# Patient Record
Sex: MALE | Race: WHITE | NOT HISPANIC OR LATINO | Employment: STUDENT | URBAN - METROPOLITAN AREA
[De-identification: names, ages, dates, MRNs, and addresses within clinical notes are randomized per-mention and may not be internally consistent; named-entity substitution may affect disease eponyms.]

---
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
## 2023-01-09 DIAGNOSIS — F41.8 DEPRESSION WITH ANXIETY: ICD-10-CM

## 2023-01-09 RX ORDER — FLUOXETINE 20 MG/1
TABLET, FILM COATED ORAL
Qty: 30 TABLET | Refills: 1 | Status: SHIPPED | OUTPATIENT
Start: 2023-01-09 | End: 2023-02-08

## 2023-01-09 NOTE — TELEPHONE ENCOUNTER
"Request for medication refill:  Fluoxetine 20 mg tablets    Providers if patient needs an appointment and you are willing to give a one month supply please refill for one month and  send a letter/MyChart using \".SMILLIMITEDREFILL\" .smillimited and route chart to \"P [facility] \" (Giving one month refill in non controlled medications is strongly recommended before denial)    If refill has been denied, meaning absolutely no refills without visit, please complete the smart phrase \".smirxrefuse\" and route it to the \"P [facility] MED REFILLS\"  pool to inform the patient and the pharmacy.    Ruy Banegas MA        "

## 2023-02-08 ENCOUNTER — OFFICE VISIT (OUTPATIENT)
Dept: FAMILY MEDICINE | Facility: CLINIC | Age: 18
End: 2023-02-08
Payer: MEDICAID

## 2023-02-08 VITALS
OXYGEN SATURATION: 96 % | BODY MASS INDEX: 20.65 KG/M2 | DIASTOLIC BLOOD PRESSURE: 77 MMHG | RESPIRATION RATE: 16 BRPM | WEIGHT: 155.8 LBS | SYSTOLIC BLOOD PRESSURE: 120 MMHG | HEIGHT: 73 IN | TEMPERATURE: 98.1 F | HEART RATE: 63 BPM

## 2023-02-08 DIAGNOSIS — F11.20 OPIOID USE DISORDER, SEVERE, DEPENDENCE (H): ICD-10-CM

## 2023-02-08 DIAGNOSIS — Z13.9 SCREENING FOR CONDITION: Primary | ICD-10-CM

## 2023-02-08 DIAGNOSIS — F41.8 DEPRESSION WITH ANXIETY: ICD-10-CM

## 2023-02-08 PROCEDURE — 93000 ELECTROCARDIOGRAM COMPLETE: CPT | Performed by: FAMILY MEDICINE

## 2023-02-08 PROCEDURE — 99214 OFFICE O/P EST MOD 30 MIN: CPT | Performed by: FAMILY MEDICINE

## 2023-02-08 RX ORDER — FLUOXETINE 20 MG/1
20 TABLET, FILM COATED ORAL DAILY
Qty: 30 TABLET | Refills: 1 | Status: SHIPPED | OUTPATIENT
Start: 2023-02-08 | End: 2023-04-18

## 2023-02-08 ASSESSMENT — ANXIETY QUESTIONNAIRES
7. FEELING AFRAID AS IF SOMETHING AWFUL MIGHT HAPPEN: MORE THAN HALF THE DAYS
5. BEING SO RESTLESS THAT IT IS HARD TO SIT STILL: SEVERAL DAYS
GAD7 TOTAL SCORE: 13
GAD7 TOTAL SCORE: 13
IF YOU CHECKED OFF ANY PROBLEMS ON THIS QUESTIONNAIRE, HOW DIFFICULT HAVE THESE PROBLEMS MADE IT FOR YOU TO DO YOUR WORK, TAKE CARE OF THINGS AT HOME, OR GET ALONG WITH OTHER PEOPLE: EXTREMELY DIFFICULT
2. NOT BEING ABLE TO STOP OR CONTROL WORRYING: SEVERAL DAYS
1. FEELING NERVOUS, ANXIOUS, OR ON EDGE: MORE THAN HALF THE DAYS
3. WORRYING TOO MUCH ABOUT DIFFERENT THINGS: MORE THAN HALF THE DAYS
6. BECOMING EASILY ANNOYED OR IRRITABLE: NEARLY EVERY DAY

## 2023-02-08 ASSESSMENT — PATIENT HEALTH QUESTIONNAIRE - PHQ9
5. POOR APPETITE OR OVEREATING: MORE THAN HALF THE DAYS
SUM OF ALL RESPONSES TO PHQ QUESTIONS 1-9: 15

## 2023-02-08 NOTE — PROGRESS NOTES
"Carroll was seen today for ekg and refill request.    Diagnoses and all orders for this visit:    Screening for condition  -     EKG 12-lead complete w/read - Clinics    Depression with anxiety.  Major symptom is anxiety with occasional moments of panic-like symptoms.  He feels he is doing okay on fluoxetine and is asking for refill.  -     FLUoxetine 20 MG tablet; Take 1 tablet (20 mg) by mouth daily    Opioid use disorder, severe, dependence (H).  On methadone therapy and has been sober for 1 month.  I reinforced importance of adhering to this plan.  EKG shows QT interval of 395 ms and the EKG result was faxed to his methadone treatment center.              Subjective     EKG (Present for ekg visit ) and Refill Request (Fluoxetine)        HPI   He is here for management of opioid use disorder and depression with prominent anxiety features.  Over the past few months was converted from Suboxone therapy to methadone and is now getting his therapy through a methadone clinic.  The clinic is requesting an EKG from us to check for QT prolongation.  Patient states he has been sober for a month.  Still having significant anxiety symptoms and continues on fluoxetine.        Review of Systems         Objective    /77   Pulse 63   Temp 98.1  F (36.7  C) (Oral)   Resp 16   Ht 1.842 m (6' 0.5\")   Wt 70.7 kg (155 lb 12.8 oz)   SpO2 96%   BMI 20.84 kg/m    Body mass index is 20.84 kg/m .  Physical Exam  Constitutional:       General: He is not in acute distress.     Appearance: Normal appearance. He is normal weight.   Neurological:      Mental Status: He is alert.   Psychiatric:         Mood and Affect: Mood normal.         Behavior: Behavior normal.        EKG obtained today is normal with QT interval of 395 ms.    Office Visit on 12/06/2022   Component Date Value Ref Range Status     Cannabinoids (59-irj-3-carboxy-9-T* 12/06/2022 Detected (A)  Not Detected, Indeterminate Final    Cutoff for a positive cannabinoid " is greater than 50 ng/ml.  This is an unconfirmed screening result to be used for medical purposes only.      Phencyclidine 12/06/2022 Not Detected  Not Detected, Indeterminate Final    Cutoff for a negative PCP is 25 ng/mL or less.     Cocaine (Benzoylecgonine) 12/06/2022 Not Detected  Not Detected, Indeterminate Final    Cutoff for a negative cocaine is 150 ng/ml or less.     Methamphetamine (d-Methamphetamine) 12/06/2022 Not Detected  Not Detected, Indeterminate Final    Cutoff for a negative methamphetamine is 500 ng/ml or less.     Opiates (Morphine) 12/06/2022 Not Detected  Not Detected, Indeterminate Final    Cutoff for a negative opiate is 100 ng/ml or less.     Amphetamine (d-Amphetamine) 12/06/2022 Not Detected  Not Detected, Indeterminate Final    Cutoff for a negative amphetamine is 500 ng/mL or less.     Benzodiazepines (Nordiazepam) 12/06/2022 Not Detected  Not Detected, Indeterminate Final    Cutoff for a negative benzodiazepine is 150 ng/ml or less.     Tricyclic Antidepressants (Desipra* 12/06/2022 Detected (A)  Not Detected, Indeterminate Final    Cutoff for a positive tricyclic antidepressant is greater than 300 ng/ml.   This is an unconfirmed screening result to be used for medical purposes only.      Methadone 12/06/2022 Not Detected  Not Detected, Indeterminate Final    Cutoff for a negative methadone is 200 ng/ml or less.     Barbiturates (Butalbital) 12/06/2022 Not Detected  Not Detected, Indeterminate Final    Cutoff for a negative barbituate is 200 ng/ml or less.     Oxycodone 12/06/2022 Not Detected  Not Detected, Indeterminate Final    Cutoff for a negative oxycodone is 100 ng/mL or less.     Propoxyphene (Norpropoxyphene) 12/06/2022 Not Detected  Not Detected, Indeterminate Final    Cutoff for a negative propoxyphene is 300 ng/ml or less.     Buprenorphine 12/06/2022 Detected (A)  Not Detected, Indeterminate Final    Cutoff for a positive buprenorphine is greater than 10 ng/ml.  This is  an unconfirmed screening result to be used for medical purposes only.

## 2023-02-23 ENCOUNTER — TELEPHONE (OUTPATIENT)
Dept: FAMILY MEDICINE | Facility: CLINIC | Age: 18
End: 2023-02-23
Payer: MEDICAID

## 2023-02-23 NOTE — TELEPHONE ENCOUNTER
[facility] phone call message- patient requesting a refill:    Full Medication Name: atenolol (TENORMIN) 50 MG tablet    Dose: Take 1 tab (50 mg) in the morning and 0.5 tab (25 mg) at night daily    Pharmacy confirmed as     [id number] - [place], [place] - [address]  [place] [address]  Phone: [phone] Fax: [phone]  : Yes    Additional Comments: Patient is running very low on medication. Hoping to get a refill before the weekend.      OK to leave a message on voice mail? Yes    Primary language: English      needed? No    Call taken on February 23, 2023 at 4:01 PM by Patti Quintero

## 2023-02-24 DIAGNOSIS — I10 HYPERTENSION, UNSPECIFIED TYPE: ICD-10-CM

## 2023-02-24 RX ORDER — ATENOLOL 50 MG/1
TABLET ORAL
Qty: 135 TABLET | Refills: 3 | Status: SHIPPED | OUTPATIENT
Start: 2023-02-24 | End: 2023-03-03

## 2023-02-24 NOTE — TELEPHONE ENCOUNTER
"Request for medication refill:  atenolol (TENORMIN) 50 MG tablet    Providers if patient needs an appointment and you are willing to give a one month supply please refill for one month and  send a letter/MyChart using \".SMILLIMITEDREFILL\" .smillimited and route chart to \"P [facility] \" (Giving one month refill in non controlled medications is strongly recommended before denial)    If refill has been denied, meaning absolutely no refills without visit, please complete the smart phrase \".smirxrefuse\" and route it to the \"P [facility] MED REFILLS\"  pool to inform the patient and the pharmacy.    Ruy Banegas MA        "

## 2023-02-24 NOTE — TELEPHONE ENCOUNTER
"Duplicate request.  Please see encounter dated 2/24/2023.  Closing encounter.        Last seen : 2/8/2023   Request for medication refill:    atenolol (TENORMIN) 50 MG tablet    Providers if patient needs an appointment and you are willing to give a one month supply please refill for one month and  send a letter/MyChart using \".SMILLIMITEDREFILL\" .smillimited and route chart to \"P [facility] \" (Giving one month refill in non controlled medications is strongly recommended before denial)    If refill has been denied, meaning absolutely no refills without visit, please complete the smart phrase \".smirxrefuse\" and route it to the \"P [facility] MED REFILLS\"  pool to inform the patient and the pharmacy.    PHAM ESPINOZA RN       "

## 2023-02-28 NOTE — TELEPHONE ENCOUNTER
[facility] phone call message- patient requesting to speak with PCP or provider:    PCP: Vik Mora    Additional Comments: Can precepting doctor take a look?     Is a call back needed? Yes    Patient informed that it may take up to 2 business days to hear back from PCP:Yes    OK to leave a message on voice mail? Yes    Primary language: English      needed? No    Call taken on February 28, 2023 at 12:55 PM by Luis Christy

## 2023-03-03 ENCOUNTER — TELEPHONE (OUTPATIENT)
Dept: FAMILY MEDICINE | Facility: CLINIC | Age: 18
End: 2023-03-03
Payer: MEDICAID

## 2023-03-03 DIAGNOSIS — I10 HYPERTENSION, UNSPECIFIED TYPE: ICD-10-CM

## 2023-03-03 RX ORDER — ATENOLOL 50 MG/1
TABLET ORAL
Qty: 90 TABLET | OUTPATIENT
Start: 2023-03-03

## 2023-03-03 RX ORDER — ATENOLOL 50 MG/1
50 TABLET ORAL
Qty: 135 TABLET | Refills: 0 | Status: SHIPPED | OUTPATIENT
Start: 2023-03-03 | End: 2023-06-29

## 2023-03-03 NOTE — TELEPHONE ENCOUNTER
[facility] phone call message- medication clarification/question:    Full Medication Name: atenolol (TENORMIN) 50 MG tablet    Question: This medication was sent to the [facility] in [place] on 2/24/2023 and confirmed by the pharmacy) but that [facility] is stating they did not rec'v the e-script. The patient is requesting this prescription be sent to the [facility] listed below now.    The patient is completely out of this medication.    Pharmacy confirmed as      [id number] - [place], [place] - [address] AT [address] [place] [address]    : Yes    OK to leave a message on voice mail? Yes    Primary language: English      needed? No    Call taken on March 3, 2023 at 3:02 PM by Dulce Valenzuela

## 2023-03-03 NOTE — TELEPHONE ENCOUNTER
"Last seen 2/8/23    Request for medication refill:  atenolol (TENORMIN) 50 MG tablet  Providers if patient needs an appointment and you are willing to give a one month supply please refill for one month and  send a letter/MyChart using \".SMILLIMITEDREFILL\" .smillimited and route chart to \"P [facility] \" (Giving one month refill in non controlled medications is strongly recommended before denial)    If refill has been denied, meaning absolutely no refills without visit, please complete the smart phrase \".smirxrefuse\" and route it to the \"P [facility] MED REFILLS\"  pool to inform the patient and the pharmacy.    Sophie Leong RN        "
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
## 2023-03-26 DIAGNOSIS — F41.8 DEPRESSION WITH ANXIETY: ICD-10-CM

## 2023-03-26 DIAGNOSIS — G47.00 INSOMNIA, UNSPECIFIED TYPE: ICD-10-CM

## 2023-03-26 RX ORDER — GABAPENTIN 800 MG/1
TABLET ORAL
Qty: 180 TABLET | Refills: 1 | Status: SHIPPED | OUTPATIENT
Start: 2023-03-26 | End: 2023-06-29

## 2023-03-26 RX ORDER — QUETIAPINE FUMARATE 100 MG/1
TABLET, FILM COATED ORAL
Qty: 180 TABLET | Refills: 1 | Status: SHIPPED | OUTPATIENT
Start: 2023-03-26 | End: 2023-06-29

## 2023-04-15 DIAGNOSIS — F41.8 DEPRESSION WITH ANXIETY: ICD-10-CM

## 2023-04-17 NOTE — TELEPHONE ENCOUNTER
"Request for medication refill:  FLUoxetine 20 MG tablet  Providers if patient needs an appointment and you are willing to give a one month supply please refill for one month and  send a letter/MyChart using \".SMILLIMITEDREFILL\" .smillimited and route chart to \"P [facility] \" (Giving one month refill in non controlled medications is strongly recommended before denial)    If refill has been denied, meaning absolutely no refills without visit, please complete the smart phrase \".smirxrefuse\" and route it to the \"P [facility] MED REFILLS\"  pool to inform the patient and the pharmacy.    Farzaneh Pedraza      "

## 2023-04-18 RX ORDER — FLUOXETINE 20 MG/1
TABLET, FILM COATED ORAL
Qty: 30 TABLET | Refills: 5 | Status: SHIPPED | OUTPATIENT
Start: 2023-04-18 | End: 2023-06-29

## 2023-06-28 DIAGNOSIS — F41.8 DEPRESSION WITH ANXIETY: ICD-10-CM

## 2023-06-28 DIAGNOSIS — I10 HYPERTENSION, UNSPECIFIED TYPE: ICD-10-CM

## 2023-06-28 DIAGNOSIS — G47.00 INSOMNIA, UNSPECIFIED TYPE: ICD-10-CM

## 2023-06-28 NOTE — TELEPHONE ENCOUNTER
[facility] phone call message- patient requesting a refill:    Full Medication Name: FLUoxetine 20 MG tablet    Dose: TAKE 1 TABLET(20 MG) BY MOUTH DAILY      Full Medication Name: gabapentin (NEURONTIN) 800 MG tablet    Dose: TAKE 1 TABLET(800 MG) BY MOUTH TWICE DAILY        Full Medication Name: QUEtiapine (SEROQUEL) 100 MG tablet    Dose: TAKE 2 TABLETS(200 MG) BY MOUTH AT BEDTIME        Full Medication Name: atenolol (TENORMIN) 50 MG tablet    Dose: Take 1 tablet (50 mg) by mouth daily before breakfast - Oral      Pharmacy confirmed as   [facility] #[id number] - [place] - [address]  [place] [address]  Phone: [phone] Fax: [phone]  : Yes    Additional Comments:      OK to leave a message on voice mail? Yes    Primary language: English      needed? No    Call taken on June 28, 2023 at 11:51 AM by Luis Christy

## 2023-06-29 RX ORDER — FLUOXETINE 20 MG/1
20 TABLET, FILM COATED ORAL DAILY
Qty: 30 TABLET | Refills: 5 | Status: SHIPPED | OUTPATIENT
Start: 2023-06-29 | End: 2024-04-05

## 2023-06-29 RX ORDER — ATENOLOL 50 MG/1
50 TABLET ORAL
Qty: 135 TABLET | Refills: 0 | Status: SHIPPED | OUTPATIENT
Start: 2023-06-29 | End: 2023-09-28

## 2023-06-29 RX ORDER — GABAPENTIN 800 MG/1
TABLET ORAL
Qty: 180 TABLET | Refills: 1 | Status: SHIPPED | OUTPATIENT
Start: 2023-06-29 | End: 2023-12-28

## 2023-06-29 RX ORDER — QUETIAPINE FUMARATE 100 MG/1
200 TABLET, FILM COATED ORAL AT BEDTIME
Qty: 180 TABLET | Refills: 1 | Status: SHIPPED | OUTPATIENT
Start: 2023-06-29 | End: 2023-12-28

## 2023-06-29 NOTE — TELEPHONE ENCOUNTER
"Last seen 2/8/2023    Request for medication refill:  atenolol (TENORMIN) 50 MG tablet  FLUoxetine 20 MG tablet  gabapentin (NEURONTIN) 800 MG tablet  QUEtiapine (SEROQUEL) 100 MG tablet    Providers if patient needs an appointment and you are willing to give a one month supply please refill for one month and  send a letter/MyChart using \".SMILLIMITEDREFILL\" .smillimited and route chart to \"P [facility] \" (Giving one month refill in non controlled medications is strongly recommended before denial)    If refill has been denied, meaning absolutely no refills without visit, please complete the smart phrase \".smirxrefuse\" and route it to the \"P [facility] MED REFILLS\"  pool to inform the patient and the pharmacy.    Sophie Leong RN        "

## 2023-09-16 ENCOUNTER — HEALTH MAINTENANCE LETTER (OUTPATIENT)
Age: 18
End: 2023-09-16

## 2023-09-18 NOTE — ADDENDUM NOTE
Encounter addended by: Tawanda Mathias on: 5/20/2021 2:40 PM   Actions taken: Charge Capture section accepted Detail Level: Zone

## 2023-09-28 DIAGNOSIS — I10 HYPERTENSION, UNSPECIFIED TYPE: ICD-10-CM

## 2023-09-28 RX ORDER — ATENOLOL 50 MG/1
50 TABLET ORAL
Qty: 135 TABLET | Refills: 0 | Status: SHIPPED | OUTPATIENT
Start: 2023-09-28 | End: 2023-12-04

## 2023-09-28 NOTE — TELEPHONE ENCOUNTER
[facility] phone call message- patient requesting a refill:    Full Medication Name: atenolol (TENORMIN) 50 MG tablet     Dose: Take 1 tablet (50 mg) by mouth daily before breakfast - Oral     Pharmacy confirmed as   [facility][id number] - [place], [place] - [address] [facility] [address] [facility] [address]  [address] [facility] [address]  [facility] [address]  Phone: [phone] Fax: [phone]    : Yes    Additional Comments:      OK to leave a message on voice mail? Yes    Primary language: English      needed? No    Call taken on September 28, 2023 at 9:30 AM by Luis Christy

## 2023-09-28 NOTE — TELEPHONE ENCOUNTER
"Last seen 2/8/23      Request for medication refill:    atenolol (TENORMIN) 50 MG tablet     Providers if patient needs an appointment and you are willing to give a one month supply please refill for one month and  send a letter/MyChart using \".SMILLIMITEDREFILL\" .smillimited and route chart to \"P [facility] \" (Giving one month refill in non controlled medications is strongly recommended before denial)    If refill has been denied, meaning absolutely no refills without visit, please complete the smart phrase \".smirxrefuse\" and route it to the \"P SMI MED REFILLS\"  pool to inform the patient and the pharmacy.    Ariane Griffith RN    "

## 2023-10-31 NOTE — PROGRESS NOTES
Resident participated in a 30 minute recreation period during which he played a video game with staff and peers.    Quality 111:Pneumonia Vaccination Status For Older Adults: Pneumococcal Vaccination Previously Received Quality 226: Preventive Care And Screening: Tobacco Use: Screening And Cessation Intervention: Patient screened for tobacco use and is an ex/non-smoker Detail Level: Detailed Quality 130: Documentation Of Current Medications In The Medical Record: Current Medications Documented Quality 131: Pain Assessment And Follow-Up: Pain assessment using a standardized tool is documented as negative, no follow-up plan required Quality 431: Preventive Care And Screening: Unhealthy Alcohol Use - Screening: Patient screened for unhealthy alcohol use using a single question and scores less than 2 times per year Quality 110: Preventive Care And Screening: Influenza Immunization: Influenza Immunization previously received during influenza season

## 2023-12-04 ENCOUNTER — TELEPHONE (OUTPATIENT)
Dept: FAMILY MEDICINE | Facility: CLINIC | Age: 18
End: 2023-12-04

## 2023-12-04 DIAGNOSIS — I10 HYPERTENSION, UNSPECIFIED TYPE: ICD-10-CM

## 2023-12-04 RX ORDER — ATENOLOL 50 MG/1
75 TABLET ORAL
Qty: 135 TABLET | Refills: 3 | Status: SHIPPED | OUTPATIENT
Start: 2023-12-04 | End: 2024-05-07

## 2023-12-04 NOTE — TELEPHONE ENCOUNTER
[facility] phone call message- patient requesting a refill:    Full Medication Name: atenolol (TENORMIN) 50 MG tablet         Pharmacy confirmed as     [facility] #[id number] - [place], [place] - [address] [facility] [address] [facility] [address]  [address]  [place] [address]  Phone: [phone] Fax: [phone]    : Yes    Additional Comments: The patient is requesting a refill. The patient also states this prescription dosage is suppose to be 1 and a 1/2 tablets a day.     OK to leave a message on voice mail? Yes    Primary language: English      needed? No    Call taken on December 4, 2023 at 11:11 AM by Dulce Valenzuela

## 2023-12-04 NOTE — TELEPHONE ENCOUNTER
"Patient requesting refill, please see note that script is incorrect. I did look at last visit AVS and it does say to take 1 tab AM, 0.5 tab PM. Please adjust script and change sig    Additional Comments: The patient is requesting a refill. The patient also states this prescription dosage is suppose to be 1 and a 1/2 tablets a day.     Request for medication refill:  atenolol (TENORMIN) 50 MG tablet   Providers if patient needs an appointment and you are willing to give a one month supply please refill for one month and  send a letter/MyChart using \".SMILLIMITEDREFILL\" .smillimited and route chart to \"P [facility] \" (Giving one month refill in non controlled medications is strongly recommended before denial)    If refill has been denied, meaning absolutely no refills without visit, please complete the smart phrase \".smirxrefuse\" and route it to the \"P [facility] MED REFILLS\"  pool to inform the patient and the pharmacy.    Sophie Leong RN      "

## 2023-12-28 DIAGNOSIS — G47.00 INSOMNIA, UNSPECIFIED TYPE: ICD-10-CM

## 2023-12-28 DIAGNOSIS — F41.8 DEPRESSION WITH ANXIETY: ICD-10-CM

## 2023-12-28 RX ORDER — QUETIAPINE FUMARATE 100 MG/1
200 TABLET, FILM COATED ORAL AT BEDTIME
Qty: 60 TABLET | Refills: 1 | Status: SHIPPED | OUTPATIENT
Start: 2023-12-28 | End: 2024-02-28

## 2023-12-28 RX ORDER — GABAPENTIN 800 MG/1
TABLET ORAL
Qty: 60 TABLET | Refills: 0 | Status: SHIPPED | OUTPATIENT
Start: 2023-12-28 | End: 2024-01-29

## 2023-12-28 NOTE — TELEPHONE ENCOUNTER
"Request for medication refill:  gabapentin (NEURONTIN) 800 MG tablet     QUEtiapine (SEROQUEL) 100 MG tablet     Providers if patient needs an appointment and you are willing to give a one month supply please refill for one month and  send a letter/MyChart using \".SMILLIMITEDREFILL\" .smillimited and route chart to \"P [facility] \" (Giving one month refill in non controlled medications is strongly recommended before denial)    If refill has been denied, meaning absolutely no refills without visit, please complete the smart phrase \".smirxrefuse\" and route it to the \"P [facility]MI MED REFILLS\"  pool to inform the patient and the pharmacy.    Wesly Flood, CMA      "

## 2024-01-29 ENCOUNTER — TELEPHONE (OUTPATIENT)
Dept: FAMILY MEDICINE | Facility: CLINIC | Age: 19
End: 2024-01-29

## 2024-01-29 DIAGNOSIS — F41.8 DEPRESSION WITH ANXIETY: ICD-10-CM

## 2024-01-29 RX ORDER — GABAPENTIN 800 MG/1
TABLET ORAL
Qty: 60 TABLET | Refills: 0 | Status: SHIPPED | OUTPATIENT
Start: 2024-01-29 | End: 2024-02-28

## 2024-01-29 NOTE — TELEPHONE ENCOUNTER
[facility] phone call message- patient requesting to speak with PCP or provider:    PCP: Vik Mora    Additional Comments: The patient would like to discuss why his medication have been cut from a 90 day supply to 30.    QUEtiapine (SEROQUEL) 100 MG tablet     Is a call back needed? Yes    Patient informed that it may take up to 2 business days to hear back from PCP:Yes    OK to leave a message on voice mail? Yes      Primary language: English      needed? No    Call taken on January 29, 2024 at 12:28 PM by Dulce Valenzuela

## 2024-01-29 NOTE — TELEPHONE ENCOUNTER
Attempted to reach patient, unable to leave message as voicemail is not set up.    If patient calls back, they need to schedule an appointment    Sophie Leong RN

## 2024-01-29 NOTE — TELEPHONE ENCOUNTER
This is detailed in the letter I wrote him -he has not been seen for a year -needs a visit before the refill of 3 months worth

## 2024-01-30 NOTE — TELEPHONE ENCOUNTER
Called patient and was able to reach.    I explained that patient needs an appointment, he understood but let me know that he does not have insurance at this time. I let patient know that I could have a SW call patient to discuss options and he agreed.    I asked the  to run the insurance and they show the Medicaid is active so I called patient back and scheduled for a visit that worked with patients work schedule. Patient agreed to be scheduled on 2/23/24 at 10am    Sophie Leong RN

## 2024-02-28 DIAGNOSIS — F41.8 DEPRESSION WITH ANXIETY: ICD-10-CM

## 2024-02-28 DIAGNOSIS — G47.00 INSOMNIA, UNSPECIFIED TYPE: ICD-10-CM

## 2024-02-28 NOTE — TELEPHONE ENCOUNTER
[facility] phone call message- patient requesting a refill:    Full Medication Name: QUEtiapine (SEROQUEL) 100 MG tablet     Dose: Take 2 tablets (200 mg) by mouth at bedtime ( Visit needed before next refill) - Oral           Full Medication Name: gabapentin (NEURONTIN) 800 MG tablet     Dose: TAKE 1 TABLET BY MOUTH TWICE DAILY       Pharmacy confirmed as   [facility] #[id number] - [place] [id number] [place] [address] [address] [facility] [address] [facility] [address]  [address] [place] [address]  Phone: [phone] Fax: [phone]  : Yes    Additional Comments: Has visit scheduled for 3/8, thought the 2/23 visit was virtual.    OK to leave a message on voice mail? Yes    Primary language: English      needed? No    Call taken on February 28, 2024 at 3:37 PM by Luis Christy

## 2024-02-29 DIAGNOSIS — G47.00 INSOMNIA, UNSPECIFIED TYPE: ICD-10-CM

## 2024-02-29 DIAGNOSIS — F41.8 DEPRESSION WITH ANXIETY: ICD-10-CM

## 2024-02-29 RX ORDER — QUETIAPINE FUMARATE 100 MG/1
200 TABLET, FILM COATED ORAL AT BEDTIME
Qty: 60 TABLET | Refills: 1 | OUTPATIENT
Start: 2024-02-29

## 2024-02-29 RX ORDER — GABAPENTIN 800 MG/1
TABLET ORAL
Qty: 60 TABLET | Refills: 0 | OUTPATIENT
Start: 2024-02-29

## 2024-02-29 RX ORDER — QUETIAPINE FUMARATE 100 MG/1
200 TABLET, FILM COATED ORAL AT BEDTIME
Qty: 30 TABLET | Refills: 0 | Status: SHIPPED | OUTPATIENT
Start: 2024-02-29 | End: 2024-03-08

## 2024-02-29 RX ORDER — GABAPENTIN 800 MG/1
TABLET ORAL
Qty: 30 TABLET | Refills: 0 | Status: SHIPPED | OUTPATIENT
Start: 2024-02-29 | End: 2024-03-08

## 2024-02-29 NOTE — TELEPHONE ENCOUNTER
Medication Refill Denied  Reason: Patient needs:  this is a duplicate request  Provider: I have not called the patient about the Rx denial, please call.  PCS: Please notify the pharmacy, Pt has an appointment next week  Vik Mora MD

## 2024-02-29 NOTE — TELEPHONE ENCOUNTER
Patient requested refill has not been in since 10/03/22 had a no show apt recently.  Refilled medications for 15 days has a follow up appointment ob 4/7 so has enough  until then.

## 2024-03-08 DIAGNOSIS — F41.8 DEPRESSION WITH ANXIETY: ICD-10-CM

## 2024-03-08 DIAGNOSIS — G47.00 INSOMNIA, UNSPECIFIED TYPE: ICD-10-CM

## 2024-03-08 NOTE — TELEPHONE ENCOUNTER
[facility] phone call message- patient requesting a refill:    Full Medication Name:   -gabapentin (NEURONTIN) 800 MG tablet   -QUEtiapine (SEROQUEL) 100 MG tablet         Pharmacy confirmed as   [facility] #[id number] - [place], [place] - [address] [facility] [address] [facility] [address]  [address]  [place] [address]  Phone: [phone] Fax: [phone]    : Yes    Additional Comments: The patient missed recent appt. The patient have rescheduled but is requesting a 'bridge' refill.     OK to leave a message on voice mail? Yes      Primary language: English      needed? No    Call taken on March 8, 2024 at 3:01 PM by Dulce Valenzuela

## 2024-03-08 NOTE — TELEPHONE ENCOUNTER
"Last seen 2/8/23, pt had an appt today but cancelled and rescheduled for 3/18/24. PCP sent 15 day supply of meds on 2/29/24. Pt requesting \"bridge\" to get to appt. Sending to provider for review.      Request for medication refill:    QUEtiapine (SEROQUEL) 100 MG tablet   gabapentin (NEURONTIN) 800 MG tablet     Providers if patient needs an appointment and you are willing to give a one month supply please refill for one month and  send a letter/MyChart using \".SMILLIMITEDREFILL\" .smillimited and route chart to \"P [facility] \" (Giving one month refill in non controlled medications is strongly recommended before denial)    If refill has been denied, meaning absolutely no refills without visit, please complete the smart phrase \".smirxrefuse\" and route it to the \"P [facility] MED REFILLS\"  pool to inform the patient and the pharmacy.    Ariane Griffith RN    "

## 2024-03-10 RX ORDER — QUETIAPINE FUMARATE 100 MG/1
200 TABLET, FILM COATED ORAL AT BEDTIME
Qty: 30 TABLET | Refills: 0 | Status: SHIPPED | OUTPATIENT
Start: 2024-03-10 | End: 2024-04-05

## 2024-03-10 RX ORDER — GABAPENTIN 800 MG/1
TABLET ORAL
Qty: 30 TABLET | Refills: 0 | Status: SHIPPED | OUTPATIENT
Start: 2024-03-10 | End: 2024-04-05

## 2024-04-01 DIAGNOSIS — F41.8 DEPRESSION WITH ANXIETY: ICD-10-CM

## 2024-04-01 DIAGNOSIS — G47.00 INSOMNIA, UNSPECIFIED TYPE: ICD-10-CM

## 2024-04-01 RX ORDER — GABAPENTIN 800 MG/1
TABLET ORAL
Qty: 30 TABLET | Refills: 0 | OUTPATIENT
Start: 2024-04-01

## 2024-04-01 RX ORDER — QUETIAPINE FUMARATE 100 MG/1
200 TABLET, FILM COATED ORAL AT BEDTIME
Qty: 30 TABLET | Refills: 0 | OUTPATIENT
Start: 2024-04-01

## 2024-04-01 NOTE — TELEPHONE ENCOUNTER
Frequent no shows and requests and then noshows again   Needs a visit scheduled before I will refill agian

## 2024-04-01 NOTE — TELEPHONE ENCOUNTER
[facility] phone call message- patient requesting a refill:    Full Medication Name:   -QUEtiapine (SEROQUEL) 100 MG tablet   -QUEtiapine (SEROQUEL) 100 MG tablet         Pharmacy confirmed as     [facility] #[id number] - [place], [place] - [address] [facility] [address]  [address]  [place] [address]  Phone: [phone] Fax: [phone]      : Yes    Additional Comments: The patient is requesting enough of a refill to get him to his appt scheduled on 4/5/24.     OK to leave a message on voice mail? Yes      Primary language: English      needed? No    Call taken on April 1, 2024 at 8:14 AM by Dulce Valenzuela

## 2024-04-01 NOTE — TELEPHONE ENCOUNTER
"Patient is scheduled 4/5/24  Last seen 2/8/23, pt has had multiple appointments but has either cancelled or no showed to all of them. PCP sent 15 day supply of meds on 2/29/24 and 3/10/24.  Pt requesting \"bridge\" to get to appt. Sending to provider for review.     Request for medication refill:    Providers if patient needs an appointment and you are willing to give a one month supply please refill for one month and  send a letter/MyChart using \".SMILLIMITEDREFILL\" .smillimited and route chart to \"P [facility] \" (Giving one month refill in non controlled medications is strongly recommended before denial)    If refill has been denied, meaning absolutely no refills without visit, please complete the smart phrase \".smirxrefuse\" and route it to the \"P [facility] MED REFILLS\"  pool to inform the patient and the pharmacy.    Sophie Leong RN          "

## 2024-04-05 ENCOUNTER — OFFICE VISIT (OUTPATIENT)
Dept: FAMILY MEDICINE | Facility: CLINIC | Age: 19
End: 2024-04-05
Payer: MEDICAID

## 2024-04-05 VITALS
OXYGEN SATURATION: 97 % | TEMPERATURE: 98.1 F | WEIGHT: 175.4 LBS | HEART RATE: 71 BPM | SYSTOLIC BLOOD PRESSURE: 129 MMHG | DIASTOLIC BLOOD PRESSURE: 78 MMHG | RESPIRATION RATE: 13 BRPM | HEIGHT: 74 IN | BODY MASS INDEX: 22.51 KG/M2

## 2024-04-05 DIAGNOSIS — F33.1 MODERATE EPISODE OF RECURRENT MAJOR DEPRESSIVE DISORDER (H): ICD-10-CM

## 2024-04-05 DIAGNOSIS — F41.1 GAD (GENERALIZED ANXIETY DISORDER): Primary | ICD-10-CM

## 2024-04-05 DIAGNOSIS — F11.20 PATIENT ON METHADONE MAINTENANCE THERAPY (H): ICD-10-CM

## 2024-04-05 DIAGNOSIS — L70.0 ACNE VULGARIS: ICD-10-CM

## 2024-04-05 DIAGNOSIS — Z11.3 ROUTINE SCREENING FOR STI (SEXUALLY TRANSMITTED INFECTION): ICD-10-CM

## 2024-04-05 DIAGNOSIS — Z11.59 NEED FOR HEPATITIS C SCREENING TEST: ICD-10-CM

## 2024-04-05 DIAGNOSIS — G47.00 INSOMNIA, UNSPECIFIED TYPE: ICD-10-CM

## 2024-04-05 PROCEDURE — 93000 ELECTROCARDIOGRAM COMPLETE: CPT | Performed by: FAMILY MEDICINE

## 2024-04-05 PROCEDURE — 99214 OFFICE O/P EST MOD 30 MIN: CPT | Performed by: FAMILY MEDICINE

## 2024-04-05 RX ORDER — TRETINOIN 0.25 MG/G
CREAM TOPICAL AT BEDTIME
Qty: 45 G | Refills: 1 | Status: CANCELLED | OUTPATIENT
Start: 2024-04-05

## 2024-04-05 RX ORDER — TRETINOIN 0.25 MG/G
CREAM TOPICAL AT BEDTIME
Qty: 45 G | Refills: 1 | Status: SHIPPED | OUTPATIENT
Start: 2024-04-05

## 2024-04-05 RX ORDER — GABAPENTIN 800 MG/1
TABLET ORAL
Qty: 30 TABLET | Refills: 0 | Status: CANCELLED | OUTPATIENT
Start: 2024-04-05

## 2024-04-05 RX ORDER — QUETIAPINE FUMARATE 100 MG/1
200 TABLET, FILM COATED ORAL AT BEDTIME
Qty: 60 TABLET | Refills: 2 | Status: SHIPPED | OUTPATIENT
Start: 2024-04-05 | End: 2024-05-10

## 2024-04-05 RX ORDER — GABAPENTIN 800 MG/1
TABLET ORAL
Qty: 60 TABLET | Refills: 2 | Status: SHIPPED | OUTPATIENT
Start: 2024-04-05 | End: 2024-05-10

## 2024-04-05 RX ORDER — METHADONE HYDROCHLORIDE 10 MG/5ML
80 SOLUTION ORAL DAILY
COMMUNITY

## 2024-04-05 ASSESSMENT — ANXIETY QUESTIONNAIRES
1. FEELING NERVOUS, ANXIOUS, OR ON EDGE: MORE THAN HALF THE DAYS
3. WORRYING TOO MUCH ABOUT DIFFERENT THINGS: MORE THAN HALF THE DAYS
7. FEELING AFRAID AS IF SOMETHING AWFUL MIGHT HAPPEN: MORE THAN HALF THE DAYS
GAD7 TOTAL SCORE: 14
2. NOT BEING ABLE TO STOP OR CONTROL WORRYING: MORE THAN HALF THE DAYS
5. BEING SO RESTLESS THAT IT IS HARD TO SIT STILL: MORE THAN HALF THE DAYS
6. BECOMING EASILY ANNOYED OR IRRITABLE: MORE THAN HALF THE DAYS
IF YOU CHECKED OFF ANY PROBLEMS ON THIS QUESTIONNAIRE, HOW DIFFICULT HAVE THESE PROBLEMS MADE IT FOR YOU TO DO YOUR WORK, TAKE CARE OF THINGS AT HOME, OR GET ALONG WITH OTHER PEOPLE: VERY DIFFICULT
GAD7 TOTAL SCORE: 14

## 2024-04-05 ASSESSMENT — PATIENT HEALTH QUESTIONNAIRE - PHQ9
SUM OF ALL RESPONSES TO PHQ QUESTIONS 1-9: 9
5. POOR APPETITE OR OVEREATING: MORE THAN HALF THE DAYS
10. IF YOU CHECKED OFF ANY PROBLEMS, HOW DIFFICULT HAVE THESE PROBLEMS MADE IT FOR YOU TO DO YOUR WORK, TAKE CARE OF THINGS AT HOME, OR GET ALONG WITH OTHER PEOPLE: SOMEWHAT DIFFICULT
SUM OF ALL RESPONSES TO PHQ QUESTIONS 1-9: 9

## 2024-04-05 NOTE — PROGRESS NOTES
Assessment & Plan     JORGE A (generalized anxiety disorder)  Moderate episode of recurrent major depressive disorder (H)  Insomnia, unspecified type      2/8/2023     3:32 PM 4/5/2024    11:21 AM 4/5/2024    12:05 PM   PHQ   PHQ-9 Total Score 15 9 9   Q9: Thoughts of better off dead/self-harm past 2 weeks Not at all Several days Several days   F/U: Thoughts of suicide or self-harm  No    F/U: Safety concerns  No          12/6/2022    10:07 AM 2/8/2023     3:32 PM 4/5/2024    12:05 PM   JORGE A-7 SCORE   Total Score 21 13 14   Patient reports symptoms are well-controlled on current medication.  Would like to continue gabapentin 800 mg twice daily, Seroquel 200 mg daily.  Medication refilled.  Common medication side effects reviewed with patient  Offered referral to to behavioral therapy, psychiatry, patient refused  Crisis plan reviewed with patient.   EKG today revealed normal sinus rhythm, normal Qtc.   Close clinic follow up recommended, recommend following up in 1 month    Acne vulgaris  Refilled tretinoin. Recommend wearing sunscreen for protection. Common side effects of topical tretinoin reviewed    Patient on methadone maintenance therapy (H)  Continue follow up with addiction medicine as scheduled. Advised patient to seek treatment for polysubstance use- cocaine, cannabis. He verbalized understanding but does not want to pursue any additional treatment at this time. He verbalized understanding of the risks of ongoing substance use on overall health.     Routine screening for STI (sexually transmitted infection)  Need for hepatitis C screening test  Currently asymptomatic. Routine screening obtained today.       Return in about 4 weeks (around 5/3/2024) for Follow up.    Zeyad Hodges is a 18 year old, presenting for the following health issues:  Recheck Medication (refills)        4/5/2024    11:22 AM   Additional Questions   Roomed by sergey         4/5/2024    Information    services  "provided? No     HPI   Patient is here today requesting refills on his medications.  Requesting refill on gabapentin, Seroquel, tretinoin ointment.  He reports that medications help with his anxiety, depression symptoms.  He has tried fluoxetine and similar medications in the past, says that it did not help him.  He is currently in a methadone program, reports doing methadone daily.  He reports having history of opiate use disorder in the past.  More recently, states he has been using ketamine, cannabis, cocaine.  Last use of cocaine was 2 weeks ago.  Last use of ketamine was yesterday, cannabis use was yesterday as well.  He says that he does not want to seek residential or inpatient treatment for his symptoms.  He says that he is managing his symptoms on outpatient.  He also sees a therapist through the methadone clinic once a month.  He states that he does not see anybody else at this time.  He denies any thoughts of self-harm currently living with his parents.  Reports feeling safe at home, reports having a supportive partner.    He denies any ongoing symptoms today including headache, cough, trouble breathing, heart palpitations, vomiting, diarrhea, abdominal pain, urinary concerns.  Open to STI testing today.        Objective    /78 (BP Location: Left arm, Patient Position: Sitting, Cuff Size: Adult Regular)   Pulse 71   Temp 98.1  F (36.7  C) (Oral)   Resp 13   Ht 1.88 m (6' 2\")   Wt 79.6 kg (175 lb 6.4 oz)   SpO2 97%   BMI 22.52 kg/m    Body mass index is 22.52 kg/m .  Physical Exam  Constitutional:       Appearance: Normal appearance.   HENT:      Head: Normocephalic and atraumatic.      Right Ear: External ear normal.      Left Ear: External ear normal.   Eyes:      Extraocular Movements: Extraocular movements intact.      Conjunctiva/sclera: Conjunctivae normal.   Cardiovascular:      Rate and Rhythm: Normal rate and regular rhythm.      Heart sounds: Normal heart sounds.   Pulmonary:      " Effort: Pulmonary effort is normal.      Breath sounds: Normal breath sounds.   Musculoskeletal:      Cervical back: Normal range of motion.   Neurological:      General: No focal deficit present.      Mental Status: He is alert.   Psychiatric:         Mood and Affect: Mood normal.         Thought Content: Thought content normal.            Signed Electronically by: Katty Oviedo MD    Answers submitted by the patient for this visit:  Patient Health Questionnaire (Submitted on 4/5/2024)  If you checked off any problems, how difficult have these problems made it for you to do your work, take care of things at home, or get along with other people?: Somewhat difficult  PHQ9 TOTAL SCORE: 9

## 2024-04-12 ENCOUNTER — LAB (OUTPATIENT)
Dept: LAB | Facility: CLINIC | Age: 19
End: 2024-04-12
Payer: MEDICAID

## 2024-04-12 DIAGNOSIS — F33.1 MODERATE EPISODE OF RECURRENT MAJOR DEPRESSIVE DISORDER (H): ICD-10-CM

## 2024-04-12 DIAGNOSIS — F41.1 GAD (GENERALIZED ANXIETY DISORDER): ICD-10-CM

## 2024-04-12 DIAGNOSIS — Z11.3 ROUTINE SCREENING FOR STI (SEXUALLY TRANSMITTED INFECTION): ICD-10-CM

## 2024-04-12 LAB
BASOPHILS # BLD AUTO: 0 10E3/UL (ref 0–0.2)
BASOPHILS NFR BLD AUTO: 0 %
EOSINOPHIL # BLD AUTO: 0.1 10E3/UL (ref 0–0.7)
EOSINOPHIL NFR BLD AUTO: 1 %
ERYTHROCYTE [DISTWIDTH] IN BLOOD BY AUTOMATED COUNT: 13.1 % (ref 10–15)
HBA1C MFR BLD: 5.1 % (ref 0–5.6)
HCT VFR BLD AUTO: 45 % (ref 40–53)
HGB BLD-MCNC: 14.9 G/DL (ref 13.3–17.7)
IMM GRANULOCYTES # BLD: 0 10E3/UL
IMM GRANULOCYTES NFR BLD: 0 %
LYMPHOCYTES # BLD AUTO: 0.9 10E3/UL (ref 0.8–5.3)
LYMPHOCYTES NFR BLD AUTO: 13 %
MCH RBC QN AUTO: 28.8 PG (ref 26.5–33)
MCHC RBC AUTO-ENTMCNC: 33.1 G/DL (ref 31.5–36.5)
MCV RBC AUTO: 87 FL (ref 78–100)
MONOCYTES # BLD AUTO: 0.4 10E3/UL (ref 0–1.3)
MONOCYTES NFR BLD AUTO: 7 %
NEUTROPHILS # BLD AUTO: 5.3 10E3/UL (ref 1.6–8.3)
NEUTROPHILS NFR BLD AUTO: 79 %
PLATELET # BLD AUTO: 255 10E3/UL (ref 150–450)
RBC # BLD AUTO: 5.18 10E6/UL (ref 4.4–5.9)
WBC # BLD AUTO: 6.7 10E3/UL (ref 4–11)

## 2024-04-12 PROCEDURE — 87389 HIV-1 AG W/HIV-1&-2 AB AG IA: CPT | Performed by: FAMILY MEDICINE

## 2024-04-12 PROCEDURE — 36415 COLL VENOUS BLD VENIPUNCTURE: CPT | Performed by: FAMILY MEDICINE

## 2024-04-12 PROCEDURE — 80053 COMPREHEN METABOLIC PANEL: CPT

## 2024-04-12 PROCEDURE — 87491 CHLMYD TRACH DNA AMP PROBE: CPT | Performed by: FAMILY MEDICINE

## 2024-04-12 PROCEDURE — 86780 TREPONEMA PALLIDUM: CPT

## 2024-04-12 PROCEDURE — 85025 COMPLETE CBC W/AUTO DIFF WBC: CPT

## 2024-04-12 PROCEDURE — 86803 HEPATITIS C AB TEST: CPT | Performed by: FAMILY MEDICINE

## 2024-04-12 PROCEDURE — 87591 N.GONORRHOEAE DNA AMP PROB: CPT | Performed by: FAMILY MEDICINE

## 2024-04-12 PROCEDURE — 83036 HEMOGLOBIN GLYCOSYLATED A1C: CPT

## 2024-04-13 LAB
ALBUMIN SERPL BCG-MCNC: 4.8 G/DL (ref 3.5–5.2)
ALP SERPL-CCNC: 92 U/L (ref 40–150)
ALT SERPL W P-5'-P-CCNC: 16 U/L (ref 0–70)
ANION GAP SERPL CALCULATED.3IONS-SCNC: 12 MMOL/L (ref 7–15)
AST SERPL W P-5'-P-CCNC: 28 U/L (ref 0–45)
BILIRUB SERPL-MCNC: 0.8 MG/DL
BUN SERPL-MCNC: 12.8 MG/DL (ref 6–20)
C TRACH DNA SPEC QL NAA+PROBE: NEGATIVE
CALCIUM SERPL-MCNC: 9.8 MG/DL (ref 8.6–10)
CHLORIDE SERPL-SCNC: 102 MMOL/L (ref 98–107)
CREAT SERPL-MCNC: 0.87 MG/DL (ref 0.67–1.17)
DEPRECATED HCO3 PLAS-SCNC: 25 MMOL/L (ref 22–29)
EGFRCR SERPLBLD CKD-EPI 2021: >90 ML/MIN/1.73M2
GLUCOSE SERPL-MCNC: 139 MG/DL (ref 70–99)
HCV AB SERPL QL IA: NONREACTIVE
HIV 1+2 AB+HIV1 P24 AG SERPL QL IA: NONREACTIVE
N GONORRHOEA DNA SPEC QL NAA+PROBE: NEGATIVE
POTASSIUM SERPL-SCNC: 4.2 MMOL/L (ref 3.4–5.3)
PROT SERPL-MCNC: 7.4 G/DL (ref 6.4–8.3)
SODIUM SERPL-SCNC: 139 MMOL/L (ref 135–145)
T PALLIDUM AB SER QL: NONREACTIVE

## 2024-05-07 ENCOUNTER — MYC REFILL (OUTPATIENT)
Dept: FAMILY MEDICINE | Facility: CLINIC | Age: 19
End: 2024-05-07

## 2024-05-07 DIAGNOSIS — I10 HYPERTENSION, UNSPECIFIED TYPE: ICD-10-CM

## 2024-05-08 NOTE — TELEPHONE ENCOUNTER

## 2024-05-09 RX ORDER — ATENOLOL 50 MG/1
75 TABLET ORAL
Qty: 135 TABLET | Refills: 3 | Status: SHIPPED | OUTPATIENT
Start: 2024-05-09 | End: 2024-05-10

## 2024-05-10 ENCOUNTER — OFFICE VISIT (OUTPATIENT)
Dept: FAMILY MEDICINE | Facility: CLINIC | Age: 19
End: 2024-05-10
Payer: MEDICAID

## 2024-05-10 VITALS
BODY MASS INDEX: 22.07 KG/M2 | OXYGEN SATURATION: 98 % | WEIGHT: 172 LBS | TEMPERATURE: 97.8 F | HEIGHT: 74 IN | DIASTOLIC BLOOD PRESSURE: 74 MMHG | HEART RATE: 60 BPM | SYSTOLIC BLOOD PRESSURE: 120 MMHG | RESPIRATION RATE: 14 BRPM

## 2024-05-10 DIAGNOSIS — F41.1 GAD (GENERALIZED ANXIETY DISORDER): Primary | ICD-10-CM

## 2024-05-10 DIAGNOSIS — M54.6 CHRONIC MIDLINE THORACIC BACK PAIN: ICD-10-CM

## 2024-05-10 DIAGNOSIS — F33.1 MODERATE EPISODE OF RECURRENT MAJOR DEPRESSIVE DISORDER (H): ICD-10-CM

## 2024-05-10 DIAGNOSIS — G47.00 INSOMNIA, UNSPECIFIED TYPE: ICD-10-CM

## 2024-05-10 DIAGNOSIS — I10 HYPERTENSION, UNSPECIFIED TYPE: ICD-10-CM

## 2024-05-10 DIAGNOSIS — G89.29 CHRONIC MIDLINE THORACIC BACK PAIN: ICD-10-CM

## 2024-05-10 PROCEDURE — 99214 OFFICE O/P EST MOD 30 MIN: CPT | Performed by: FAMILY MEDICINE

## 2024-05-10 RX ORDER — QUETIAPINE FUMARATE 100 MG/1
200 TABLET, FILM COATED ORAL AT BEDTIME
Qty: 60 TABLET | Refills: 0 | Status: SHIPPED | OUTPATIENT
Start: 2024-05-10 | End: 2024-05-30

## 2024-05-10 RX ORDER — GABAPENTIN 800 MG/1
TABLET ORAL
Qty: 60 TABLET | Refills: 0 | Status: SHIPPED | OUTPATIENT
Start: 2024-05-10 | End: 2024-05-30

## 2024-05-10 RX ORDER — ATENOLOL 50 MG/1
75 TABLET ORAL
Qty: 135 TABLET | Refills: 0 | Status: SHIPPED | OUTPATIENT
Start: 2024-05-10 | End: 2024-07-29

## 2024-05-10 ASSESSMENT — ANXIETY QUESTIONNAIRES
GAD7 TOTAL SCORE: 19
8. IF YOU CHECKED OFF ANY PROBLEMS, HOW DIFFICULT HAVE THESE MADE IT FOR YOU TO DO YOUR WORK, TAKE CARE OF THINGS AT HOME, OR GET ALONG WITH OTHER PEOPLE?: EXTREMELY DIFFICULT
1. FEELING NERVOUS, ANXIOUS, OR ON EDGE: NEARLY EVERY DAY
4. TROUBLE RELAXING: NEARLY EVERY DAY
6. BECOMING EASILY ANNOYED OR IRRITABLE: NEARLY EVERY DAY
GAD7 TOTAL SCORE: 19
IF YOU CHECKED OFF ANY PROBLEMS ON THIS QUESTIONNAIRE, HOW DIFFICULT HAVE THESE PROBLEMS MADE IT FOR YOU TO DO YOUR WORK, TAKE CARE OF THINGS AT HOME, OR GET ALONG WITH OTHER PEOPLE: EXTREMELY DIFFICULT
7. FEELING AFRAID AS IF SOMETHING AWFUL MIGHT HAPPEN: NEARLY EVERY DAY
5. BEING SO RESTLESS THAT IT IS HARD TO SIT STILL: MORE THAN HALF THE DAYS
2. NOT BEING ABLE TO STOP OR CONTROL WORRYING: NEARLY EVERY DAY
7. FEELING AFRAID AS IF SOMETHING AWFUL MIGHT HAPPEN: NEARLY EVERY DAY
GAD7 TOTAL SCORE: 19
3. WORRYING TOO MUCH ABOUT DIFFERENT THINGS: MORE THAN HALF THE DAYS

## 2024-05-10 ASSESSMENT — PATIENT HEALTH QUESTIONNAIRE - PHQ9
SUM OF ALL RESPONSES TO PHQ QUESTIONS 1-9: 19
10. IF YOU CHECKED OFF ANY PROBLEMS, HOW DIFFICULT HAVE THESE PROBLEMS MADE IT FOR YOU TO DO YOUR WORK, TAKE CARE OF THINGS AT HOME, OR GET ALONG WITH OTHER PEOPLE: EXTREMELY DIFFICULT
SUM OF ALL RESPONSES TO PHQ QUESTIONS 1-9: 19

## 2024-05-10 NOTE — PROGRESS NOTES
Assessment & Plan     JORGE A (generalized anxiety disorder)  Moderate episode of recurrent major depressive disorder (H)  Insomnia, unspecified type      4/5/2024    11:21 AM 4/5/2024    12:05 PM 5/10/2024    10:53 AM   PHQ   PHQ-9 Total Score 9 9 19   Q9: Thoughts of better off dead/self-harm past 2 weeks Several days Several days Several days   F/U: Thoughts of suicide or self-harm No  No   F/U: Safety concerns No  No         2/8/2023     3:32 PM 4/5/2024    12:05 PM 5/10/2024    10:55 AM   JORGE A-7 SCORE   Total Score   19 (severe anxiety)   Total Score 13 14 19   Inadequate symptom relief with selective serotonin reuptake inhibitor, SNRI in the past. Recommend establishing with psychiatry. Referral placed  Continue gabapentin 800 mg BID, Seroquel 200 mg HS.   Patient refuses inpatient management for endorsed severe mood disorder  - gabapentin (NEURONTIN) 800 MG tablet; TAKE 1 TABLET BY MOUTH TWICE DAILY ( Visit needed before next refill only 15 days given)  - Adult Mental Health  Referral; Future    Hypertension, unspecified type  Advised against self titration of medication.  Risk of medication overdose reviewed with patient.  Continue Tylenol 75 mg daily.  Patient has been on this medication for a while now, exact reason for beta-blocker use for hypertension control unclear.  - atenolol (TENORMIN) 50 MG tablet; Take 1.5 tablets (75 mg) by mouth daily before breakfast    Chronic midline thoracic back pain  Most likely muscular sprain.  Recommend stretching exercises, physical therapy to help with muscle strengthening.  Continue symptomatic treatment with Tylenol or ibuprofen as needed.  Follow-up if symptoms worsen or do not improve.  - Physical Therapy  Referral; Future      Return in about 4 weeks (around 6/7/2024) for Follow up.    Zeyad Hodges is a 18 year old, presenting for the following health issues:  Follow Up (Pt here for a med follow up pt has sleep concerns ,some pain all over  ")      5/10/2024    11:03 AM   Additional Questions   Roomed by ej         5/10/2024    Information    services provided? No     HPI   Patient is here today with his girlfriend.  He reports that since our last visit, he was seen in the emergency room because he was doing too much ketamine, he was noted to have high blood pressure, had chest pain, heart palpitations and went to the emergency room.  He reports being sober from ketamine, cannabis for the past week.  He says that when he was worried about high blood pressure, he took 3 pills of atenolol.  He is open to seeing a psychiatrist.  Continues to have anxiety, depression symptoms.  He says that Lexapro, fluoxetine, citalopram, Cymbalta has not helped in the past.  He is following up with addiction medicine regularly, goes to the methadone clinic daily.  He reports that the gabapentin, Seroquel are helping, would like to continue taking the medication.      Pain in the back pain. Ongoing for the past 2 weeks. Noticed it when the methadone wears out. 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 No radicular pain in his extremities. No numbness, weakness in arms or legs. Open to physical therapy.     On review of systems, he also reports constipation but no other concerns.      Objective    /74   Pulse 60   Temp 97.8  F (36.6  C)   Resp 14   Ht 1.88 m (6' 2\")   Wt 78 kg (172 lb)   SpO2 98%   BMI 22.08 kg/m    Body mass index is 22.08 kg/m .  Physical Exam  Constitutional:       Appearance: Normal appearance.   HENT:      Head: Normocephalic and atraumatic.      Right Ear: External ear normal.      Left Ear: External ear normal.   Eyes:      Extraocular Movements: Extraocular movements intact.      Conjunctiva/sclera: Conjunctivae normal.   Cardiovascular:      Rate and Rhythm: Normal rate and regular rhythm.   Pulmonary:      Effort: Pulmonary effort is normal.      Breath sounds: Normal breath sounds. "   Musculoskeletal:      Cervical back: Normal and normal range of motion. No rigidity or bony tenderness. Normal range of motion.      Thoracic back: Tenderness present. No bony tenderness.      Lumbar back: No tenderness or bony tenderness. Negative right straight leg raise test and negative left straight leg raise test.        Back:    Neurological:      General: No focal deficit present.      Mental Status: He is alert.   Psychiatric:         Mood and Affect: Mood normal.         Thought Content: Thought content normal.            Signed Electronically by: Katty Oviedo MD    Answers submitted by the patient for this visit:  Patient Health Questionnaire (Submitted on 5/10/2024)  If you checked off any problems, how difficult have these problems made it for you to do your work, take care of things at home, or get along with other people?: Extremely difficult  PHQ9 TOTAL SCORE: 19  JORGE A-7 (Submitted on 5/10/2024)  JORGE A 7 TOTAL SCORE: 19

## 2024-05-28 NOTE — GROUP NOTE
Group Therapy Documentation    PATIENT'S NAME: Carroll Duke  MRN:   [id number]  :   2005  ACCT. NUMBER: [id number]  DATE OF SERVICE: 21  START TIME: 11:00 AM  END TIME: 11:35 AM  FACILITATOR(S): Erika Burks LADC; Jeana Pelaez  TOPIC: BEH Group Therapy  Number of patients attending the group:  3  Group Length:  0.5 Hours    Dimensions addressed 3    Summary of Group / Topics Discussed:    Group Therapy/Process Group:  Dual Process Group: focused on learning about dreams and subconscious mind. Group watched a short film to understand the concept of dreaming and how it relates to our awake life. Followed by group processing about dreams.     Objectives:  Learn and identify the subconscious  Process about interpretation of events, dreams, and correlation to everyday interactions      Group Attendance:  Attended group session    Patient's response to the group topic/interactions:  cooperative with task    Patient appeared to be Attentive.       Client specific details:  Resident shared his interpretation and understanding of group. Resident was vocal about his dreams and how they've correlated to using. He shared an example of how he would think about using all day and want to remain sober, but in his dream he would end up relapsing.      LETTER FAXED -[phone] PER PT REQUEST. LETTER SENT TO PT PER MYCHART.

## 2024-05-29 ENCOUNTER — MYC REFILL (OUTPATIENT)
Dept: FAMILY MEDICINE | Facility: CLINIC | Age: 19
End: 2024-05-29

## 2024-05-29 DIAGNOSIS — F41.1 GAD (GENERALIZED ANXIETY DISORDER): ICD-10-CM

## 2024-05-29 DIAGNOSIS — F33.1 MODERATE EPISODE OF RECURRENT MAJOR DEPRESSIVE DISORDER (H): ICD-10-CM

## 2024-05-29 RX ORDER — GABAPENTIN 800 MG/1
TABLET ORAL
Qty: 60 TABLET | Refills: 0 | Status: CANCELLED | OUTPATIENT
Start: 2024-05-29

## 2024-05-30 ENCOUNTER — TELEPHONE (OUTPATIENT)
Dept: FAMILY MEDICINE | Facility: CLINIC | Age: 19
End: 2024-05-30

## 2024-05-30 DIAGNOSIS — F33.1 MODERATE EPISODE OF RECURRENT MAJOR DEPRESSIVE DISORDER (H): ICD-10-CM

## 2024-05-30 DIAGNOSIS — G47.00 INSOMNIA, UNSPECIFIED TYPE: ICD-10-CM

## 2024-05-30 DIAGNOSIS — F41.1 GAD (GENERALIZED ANXIETY DISORDER): ICD-10-CM

## 2024-05-30 NOTE — TELEPHONE ENCOUNTER
"Called patient to discuss message. Patient says he was at work when he was supposed to come in yesterday, he needs refills for his meds and does not understand why Dr. Oviedo is only giving him 15 day refills. He has seen provider twice and he cannot afford to miss work any more to make these appointments.    Patient requesting additional refills from his primary provider.    Request for medication refill:    Providers if patient needs an appointment and you are willing to give a one month supply please refill for one month and  send a letter/MyChart using \".SMILLIMITEDREFILL\" .smillimited and route chart to \"P [facility] \" (Giving one month refill in non controlled medications is strongly recommended before denial)    If refill has been denied, meaning absolutely no refills without visit, please complete the smart phrase \".smirxrefuse\" and route it to the \"P [facility] MED REFILLS\"  pool to inform the patient and the pharmacy.    Sophie Leong RN      "

## 2024-05-30 NOTE — TELEPHONE ENCOUNTER
[facility] phone call message- patient requesting a refill:    Full Medication Name: QUEtiapine (SEROQUEL)     Dose: 100 MG tablet     Pharmacy confirmed as [id number] [place] - [address] [facility] [address]  [address]  [place] [address]  Phone: [phone] Fax: [phone]: Yes    Additional Comments: Patient states that he saw on [facility] that he needs to have a visit before getting refill but he just seen  on 05/10 and wants to know if he has to come in to be seen or if he can get the refill.     OK to leave a message on voice mail? Yes    Primary language: English      needed? No    Call taken on May 30, 2024 at 3:21 PM by Katy Barrientos

## 2024-05-31 ENCOUNTER — TELEPHONE (OUTPATIENT)
Dept: FAMILY MEDICINE | Facility: CLINIC | Age: 19
End: 2024-05-31

## 2024-05-31 RX ORDER — GABAPENTIN 800 MG/1
TABLET ORAL
Qty: 60 TABLET | Refills: 2 | Status: SHIPPED | OUTPATIENT
Start: 2024-05-31

## 2024-05-31 RX ORDER — QUETIAPINE FUMARATE 100 MG/1
200 TABLET, FILM COATED ORAL AT BEDTIME
Qty: 60 TABLET | Refills: 2 | Status: SHIPPED | OUTPATIENT
Start: 2024-05-31

## 2024-05-31 NOTE — TELEPHONE ENCOUNTER
"Pt called stating that they tried to  script that was sent today for gabapentin (NEURONTIN) 800 MG tablet. Pt stated per pharmacy script is on hold until 6/2. RN called pharmacy to see what is going on. Per pt chart last script was written on 5/10/24 for 30 day supply. RN called and spoke to pharmacist at [facility] who stated that pt is technically not able to pick script up per insurance until 6/5/24 but they told pt that they can pick it up on 6/2. Pharmacist stated that script for QUEtiapine (SEROQUEL) 100 MG tablet is ready but pt refused to pick that up.      Pharm D looked in PMPD and pt picked up 30 day script on 5/5/24. Pt should have enough to get through to 6/3 if taking as prescribed. RN called pt and let pt know that they can not  their script until Sunday 6/2/24. Pt stated that they are out of medication now and states that they are only taking 2 a day. RN stated that they should have a 30 day supply that would get them through 6/3 and pt stated that they have \"lost a couple\". Pt asked what he is supposed to do if he gets sick and RN advised pt that if they experience withdrawals that they should go to ED. Pt verbalized understanding. RN advised pt that they have an appt on 6/10 and pt stated they did not know they had an appt and requested RN cancel appt and they will call back and reschedule in an couple months.       Ariane Griffith RN   "

## 2024-05-31 NOTE — TELEPHONE ENCOUNTER
3 month supply sent to pharmacy. Patient was given a month supply due to concerns for back pain and was advised to follow up in a month. Refills now sent for 3 months. Needs clinic visit in 3 months    Katty Oviedo MD

## 2024-05-31 NOTE — TELEPHONE ENCOUNTER
Duplicate request for gabapentin (NEURONTIN) 800 MG tablet script sent 5/31/24    Ariane Griffith RN

## 2024-07-29 ENCOUNTER — MYC REFILL (OUTPATIENT)
Dept: FAMILY MEDICINE | Facility: CLINIC | Age: 19
End: 2024-07-29

## 2024-07-29 DIAGNOSIS — I10 HYPERTENSION, UNSPECIFIED TYPE: ICD-10-CM

## 2024-07-30 RX ORDER — ATENOLOL 50 MG/1
75 TABLET ORAL DAILY
Qty: 135 TABLET | Refills: 1 | Status: SHIPPED | OUTPATIENT
Start: 2024-07-30

## 2024-07-30 NOTE — TELEPHONE ENCOUNTER
"Request for medication refill:  atenolol (TENORMIN) 50 MG tablet    Providers if patient needs an appointment and you are willing to give a one month supply please refill for one month and  send a letter/MyChart using \".SMILLIMITEDREFILL\" .smillimited and route chart to \"P [facility] \" (Giving one month refill in non controlled medications is strongly recommended before denial)    If refill has been denied, meaning absolutely no refills without visit, please complete the smart phrase \".smirxrefuse\" and route it to the \"P [facility] MED REFILLS\"  pool to inform the patient and the pharmacy.    Lacey Edward, [facility]    "

## 2024-09-17 ENCOUNTER — OFFICE VISIT (OUTPATIENT)
Dept: FAMILY MEDICINE | Facility: CLINIC | Age: 19
End: 2024-09-17
Payer: COMMERCIAL

## 2024-09-17 VITALS
WEIGHT: 183.2 LBS | OXYGEN SATURATION: 97 % | RESPIRATION RATE: 12 BRPM | TEMPERATURE: 98.6 F | HEIGHT: 74 IN | BODY MASS INDEX: 23.51 KG/M2 | SYSTOLIC BLOOD PRESSURE: 126 MMHG | DIASTOLIC BLOOD PRESSURE: 83 MMHG | HEART RATE: 66 BPM

## 2024-09-17 DIAGNOSIS — M67.431 GANGLION CYST OF WRIST, RIGHT: Primary | ICD-10-CM

## 2024-09-17 DIAGNOSIS — M79.2 NERVE PAIN: ICD-10-CM

## 2024-09-17 DIAGNOSIS — F41.9 ANXIETY: ICD-10-CM

## 2024-09-17 DIAGNOSIS — R00.2 PALPITATIONS: ICD-10-CM

## 2024-09-17 PROCEDURE — 93000 ELECTROCARDIOGRAM COMPLETE: CPT

## 2024-09-17 PROCEDURE — 99213 OFFICE O/P EST LOW 20 MIN: CPT

## 2024-09-17 RX ORDER — GABAPENTIN 800 MG/1
800 TABLET ORAL 3 TIMES DAILY
Qty: 270 TABLET | Refills: 2 | Status: SHIPPED | OUTPATIENT
Start: 2024-09-17

## 2024-09-17 ASSESSMENT — ENCOUNTER SYMPTOMS: NUMBNESS: 1

## 2024-09-17 ASSESSMENT — PATIENT HEALTH QUESTIONNAIRE - PHQ9
SUM OF ALL RESPONSES TO PHQ QUESTIONS 1-9: 15
10. IF YOU CHECKED OFF ANY PROBLEMS, HOW DIFFICULT HAVE THESE PROBLEMS MADE IT FOR YOU TO DO YOUR WORK, TAKE CARE OF THINGS AT HOME, OR GET ALONG WITH OTHER PEOPLE: VERY DIFFICULT
SUM OF ALL RESPONSES TO PHQ QUESTIONS 1-9: 15

## 2024-09-17 NOTE — PROGRESS NOTES
Zeyad Hodges is a 19 year old, presenting for the following health issues:  Numbness (Arms, legs, feet and hands )    Tingling pain over arms, hands, neck, legs, intermittent but mostly present  - tingling pain going on for about 3 months  - states worse with anxiety, caffeine, and marijuana use  - denied trauma    Seroquel - takes in AM and PM   - has been on for 2-3 years, feels like tingling/pain worse with medication dose  - has independently halved the dose and feels like it has improved symptoms    Pt would like EKG, having palpitations, HX of anxiety    Lump over R wrist - noticed it over the last few weeks, would like surgery consult referral  - pt unsure of insurance coverage and will check    Takes Atenolol 2 tablets instead of the prescribed - pt states this helps with tingling pain  - pt monitors BP at home and reports no low BP seen          9/17/2024    10:53 AM   Additional Questions   Roomed by debbie   Accompanied by self         9/17/2024    10:53 AM   Patient Reported Additional Medications   Patient reports taking the following new medications no     Numbness  Associated symptoms include numbness.   History of Present Illness       Reason for visit:  Tingling in my feet hands arms and legs along with pain in my upper arms mainly the right arm  Symptom onset:  More than a month  Symptom intensity:  Moderate  Symptom progression:  Staying the same  Had these symptoms before:  No  What makes it worse:  Certain medication i take  What makes it better:  Taking less of a certain prescribed medication   He is taking medications regularly.                 Review of Systems  CONSTITUTIONAL: NEGATIVE for fever, chills, change in weight  INTEGUMENTARY/SKIN: NEGATIVE for worrisome rashes, moles or lesions  EYES: NEGATIVE for vision changes or irritation  ENT/MOUTH: NEGATIVE for ear, mouth and throat problems  RESP: NEGATIVE for significant cough or SOB  BREAST: NEGATIVE for masses, tenderness or  "discharge  CV: Positive for palpitations  GI: NEGATIVE for nausea, abdominal pain, heartburn, or change in bowel habits  : NEGATIVE for frequency, dysuria, or hematuria  MUSCULOSKELETAL: NEGATIVE for significant arthralgias or myalgia  NEURO: intermittent numbness and tingling pain over arms, hands, legs, feet.  ENDOCRINE: NEGATIVE for temperature intolerance, skin/hair changes  HEME: NEGATIVE for bleeding problems  PSYCHIATRIC: NEGATIVE for changes in mood or affect  PSYCHIATRIC: HX anxiety, HX depression, HX panic attacks, and insomnia      Objective    /83 (BP Location: Left arm, Patient Position: Sitting, Cuff Size: Adult Regular)   Pulse 66   Temp 98.6  F (37  C) (Oral)   Resp 12   Ht 1.867 m (6' 1.5\")   Wt 83.1 kg (183 lb 3.2 oz)   SpO2 97%   BMI 23.84 kg/m    Body mass index is 23.84 kg/m .  Physical Exam   GENERAL: alert and no distress  EYES: Eyes grossly normal to inspection, PERRL and conjunctivae and sclerae normal  HENT: ear canals and TM's normal, nose and mouth without ulcers or lesions  NECK: no adenopathy, no asymmetry, masses, or scars  RESP: lungs clear to auscultation - no rales, rhonchi or wheezes  CV: regular rate and rhythm, normal S1 S2, no S3 or S4, no murmur, click or rub, no peripheral edema  ABDOMEN: soft, nontender, no hepatosplenomegaly, no masses and bowel sounds normal  MS: no gross musculoskeletal defects noted, no edema  MS: firm, non mobile quarter size lump over R wrist, anterior.   SKIN: no suspicious lesions or rashes  NEURO: Normal strength and tone, mentation intact and speech normal  PSYCH: mentation appears normal, affect normal/bright    EKG - Reviewed and interpreted by me appears normal, NSR, normal axis, normal intervals, no acute ST/T changes c/w ischemia, no LVH by voltage criteria, unchanged from previous tracings      ASSESSMENT/PLAN    Nerve pain  Comment: Pt reports taking Gabapentin 800 mg BID for 2-3 years for anxiety, depression, nerve pain. Pt " states that this is helpful for nerve pain but not enough. Pt would like to increase to better manage nerve pain.   Plan: gabapentin (NEURONTIN) 800 MG tablet        - Increase Gabapentin 800 mg to TID from BID    Ganglion cyst of wrist, right   Comment: Quarter size firm, non mobile lump over R wrist. Present for a few months. Given location of lump, occasionally tender with activity such as pushups.   Plan: Adult Gen Surg  Referral      (R00.2) Palpitations  Comment: HX of anxiety with panic attacks. Pt reports increased palpitations with increased tingling pain. Pt would like testing for peace of mind. Reviewed results with pt prior to pt leaving.   Plan: EKG 12-lead complete w/read - Clinics        - Result with NSR    (F41.9) Anxiety  Comment: Pt encouraged to schedule appointment with Psychiatry for further evaluation and management with psychiatry given his long history of mental health concerns and symptoms. Referral reordered to prompt call to pt for scheduling. Contact number circled for pt to call in case he misses call.   Plan: Adult Mental Health  Referral    Signed Electronically by: SISSY Mcginnis CNP

## 2024-09-17 NOTE — PATIENT INSTRUCTIONS
Follow up with you PCP if increase in Gabapentin does not manage your nerve pain    Schedule your psychiatry appointment  - your pain may be related to your mental health and anxiety

## 2024-10-28 ENCOUNTER — OFFICE VISIT (OUTPATIENT)
Dept: FAMILY MEDICINE | Facility: CLINIC | Age: 19
End: 2024-10-28
Payer: COMMERCIAL

## 2024-10-28 VITALS
DIASTOLIC BLOOD PRESSURE: 76 MMHG | OXYGEN SATURATION: 97 % | RESPIRATION RATE: 18 BRPM | TEMPERATURE: 98.1 F | HEIGHT: 73 IN | HEART RATE: 81 BPM | BODY MASS INDEX: 24.44 KG/M2 | SYSTOLIC BLOOD PRESSURE: 114 MMHG | WEIGHT: 184.4 LBS

## 2024-10-28 DIAGNOSIS — F41.1 GAD (GENERALIZED ANXIETY DISORDER): Primary | ICD-10-CM

## 2024-10-28 PROCEDURE — 99213 OFFICE O/P EST LOW 20 MIN: CPT | Performed by: PHYSICIAN ASSISTANT

## 2024-10-28 RX ORDER — BUSPIRONE HYDROCHLORIDE 5 MG/1
TABLET ORAL
Qty: 120 TABLET | Refills: 0 | Status: SHIPPED | OUTPATIENT
Start: 2024-10-28

## 2024-10-28 ASSESSMENT — ENCOUNTER SYMPTOMS: NUMBNESS: 1

## 2024-10-28 ASSESSMENT — PAIN SCALES - GENERAL: PAINLEVEL_OUTOF10: MODERATE PAIN (4)

## 2024-10-28 NOTE — PROGRESS NOTES
"  Assessment & Plan     JORGE A (generalized anxiety disorder)  Discussed next steps, and he would like to try medication to help his anxiety  - busPIRone (BUSPAR) 5 MG tablet; 5 mg PO every day x 3 days; 5 mg PO BID x 3 days; 10 mg PO q am, 5 mg PO at bedtime x 3 days; 10 mg PO BID  - Adult Mental Health  Referral; Future                Subjective   Carroll is a 19 year old, presenting for the following health issues:  Numbness, Tingling, and Anxiety        10/28/2024     1:21 PM   Additional Questions   Roomed by Yessenia MONAHAN MA apprentice   Accompanied by n/a     History of Present Illness       Reason for visit:  Panic attacks  Symptom onset:  More than a month  Symptoms include:  Chest discomfort anxiety attacks coldness and tingling in body numbness in body  Symptom intensity:  Moderate  Symptom progression:  Staying the same  Had these symptoms before:  No  What makes it worse:  Stress  What makes it better:  Nothing   He is taking medications regularly.                 Review of Systems  Constitutional, HEENT, cardiovascular, pulmonary, gi and gu systems are negative, except as otherwise noted.      Objective    /76 (BP Location: Left arm, Patient Position: Sitting, Cuff Size: Adult Regular)   Pulse 81   Temp 98.1  F (36.7  C) (Temporal)   Resp 18   Ht 1.845 m (6' 0.64\")   Wt 83.6 kg (184 lb 6.4 oz)   SpO2 97%   BMI 24.57 kg/m    Body mass index is 24.57 kg/m .  Physical Exam   GENERAL: alert and no distress  EYES: Eyes grossly normal to inspection  RESP: lungs clear to auscultation - no rales, rhonchi or wheezes  CV: regular rate and rhythm, normal S1 S2, no S3 or S4, no murmur, click or rub, no peripheral edema             Signed Electronically by: Armin Beltran PA-C    "

## 2024-11-09 ENCOUNTER — HEALTH MAINTENANCE LETTER (OUTPATIENT)
Age: 19
End: 2024-11-09

## 2024-11-16 NOTE — GROUP NOTE
"Group Therapy Documentation    PATIENT'S NAME: Carroll Duke  MRN:   [id number]  :   2005  ACCT. NUMBER: [id number]  DATE OF SERVICE: 21  START TIME:  9:45 AM  END TIME: 10:30 AM  FACILITATOR(S): Jeana Pelaez; Cecelia Quinones; Erika Burks LADC  TOPIC: BEH Group Therapy  Number of patients attending the group:  5  Group Length:  1 Hours    Dimensions addressed 3, 4, 5, and 6    Summary of Group / Topics Discussed:    Group Therapy/Process Group:  Community Group  Patient completed diary card ratings for the last 24 hours including emotions, safety concerns, substance use, treatment interfering behaviors, and use of DBT skills.  Patient checked in regarding the previous evening as well as progress on treatment goals.    Patient Session Goals / Objectives:  * Patient will increase awareness of emotions and ability to identify them  * Patient will report substance use and safety concerns   * Patient will increase use of DBT skills          Group Attendance:  Attended group session    Patient's response to the group topic/interactions:  cooperative with task, discussed personal experience with topic and listened actively    Patient appeared to be Actively participating, Attentive and Engaged.       Client specific details:  Client presents in a pleasant mood. Writer handed him his diary card and he smiled stating, \"oh this again.\" Reports no SI/SIB. Rates happy as 3/5, sad 2/5, fear/anxiety 4/5, shame/guilt 2/5, and anger 3/5. He states he does not want to process anything today. States one of his goals is to \"learn to be on my own, pay attention in school, and call parents.\"  He reports the best part about yesterday was \"ping-pong\" and the worst was intake, \"that was tough.\" He reports feeling grateful for a \"4th chance at treatment.\"    "
Group Therapy Documentation    PATIENT'S NAME: Carroll Duke  MRN:   [id number]  :   2005  ACCT. NUMBER: [id number]  DATE OF SERVICE: 21  START TIME: 10:30 AM  END TIME: 11:00 AM  FACILITATOR(S): Cecelia Quinones  TOPIC: BEH Group Therapy  Number of patients attending the group:  5  Group Length:  0.5 Hours    Dimensions addressed 2 and 3    Summary of Group / Topics Discussed:    Mindfulness      Group Attendance:  Attended group session    Patient's response to the group topic/interactions:  cooperative with task    Patient appeared to be Actively participating, Attentive and Engaged.       Client specific details:  Pt participated in mindful coloring. Upon completion of this activity, pt processed the many benefits of engaging in mindful coloring including: being present in the moment, creativity, lowered heart rate, embracing calmness & tranquility. The usage of mindful coloring as a coping skill was also processed.    
Psychoeducation Group Documentation    PATIENT'S NAME: Carroll Duke  MRN:   [id number]  :   2005  ACCT. NUMBER: [id number]  DATE OF SERVICE: 21  START TIME:  4:00 PM  END TIME:  5:00 PM  FACILITATOR(S): Cassandra Webster Lisa  TOPIC: BEH Pyschoeducation  Number of patients attending the group:  4  Group Length:  1 Hours    Dimensions addressed 3, 4, 5, and 6    Summary of Group / Topics Discussed:    Using Exercise As a Coping Skill: Resident was given a brief introduction to the benefits of exercise and how it can be used as a coping skill in their recovery. Resident learned a new exercise routine and practiced it during a video instruction.      Group Attendance:  Attended group session    Patient's response to the group topic/interactions:  cooperative with task and listened actively    Patient appeared to be Actively participating and Engaged.         Client specific details:  Resident was cooperative with task and did not mind the opportunity to exercise and practice a new coping skill. Resident was engaged and showed effort in both exercise activities. Resident was engaged despite the distractions from his group members.    
[Initial Consultation] : an initial pain management consultation
[facility] [place] Screening Program/Breastfeeding Log/Guide to Postpartum Care/[place]  Immunization Record

## 2024-11-22 ENCOUNTER — MYC REFILL (OUTPATIENT)
Dept: FAMILY MEDICINE | Facility: CLINIC | Age: 19
End: 2024-11-22
Payer: COMMERCIAL

## 2024-11-22 DIAGNOSIS — F41.1 GAD (GENERALIZED ANXIETY DISORDER): ICD-10-CM

## 2024-11-22 DIAGNOSIS — M79.2 NERVE PAIN: ICD-10-CM

## 2024-11-25 RX ORDER — BUSPIRONE HYDROCHLORIDE 15 MG/1
15 TABLET ORAL 2 TIMES DAILY
Qty: 180 TABLET | Refills: 1 | Status: SHIPPED | OUTPATIENT
Start: 2024-11-25

## 2024-11-25 RX ORDER — GABAPENTIN 800 MG/1
800 TABLET ORAL 3 TIMES DAILY
Qty: 270 TABLET | Refills: 1 | Status: SHIPPED | OUTPATIENT
Start: 2024-11-25

## 2024-11-25 NOTE — TELEPHONE ENCOUNTER
"Request for medication refill:  gabapentin (NEURONTIN) 800 MG tablet     Providers if patient needs an appointment and you are willing to give a one month supply please refill for one month and  send a letter/MyChart using \".SMILLIMITEDREFILL\" .smillimited and route chart to \"P [facility] \" (Giving one month refill in non controlled medications is strongly recommended before denial)    If refill has been denied, meaning absolutely no refills without visit, please complete the smart phrase \".smirxrefuse\" and route it to the \"P [facility] MED REFILLS\"  pool to inform the patient and the pharmacy.    Wesly Flood, CMA      "

## 2025-01-14 ENCOUNTER — MYC REFILL (OUTPATIENT)
Dept: FAMILY MEDICINE | Facility: CLINIC | Age: 20
End: 2025-01-14
Payer: COMMERCIAL

## 2025-01-14 DIAGNOSIS — I10 HYPERTENSION, UNSPECIFIED TYPE: ICD-10-CM

## 2025-01-14 NOTE — TELEPHONE ENCOUNTER
"Patient last seen by Dr. Oviedo on 5/10/2024    Request for medication refill:atenolol (TENORMIN) 50 MG tablet     Providers if patient needs an appointment and you are willing to give a one month supply please refill for one month and  send a letter/MyChart using \".SMILLIMITEDREFILL\" .smillimited and route chart to \"P [facility] \" (Giving one month refill in non controlled medications is strongly recommended before denial)    If refill has been denied, meaning absolutely no refills without visit, please complete the smart phrase \".smirxrefuse\" and route it to the \"P [facility] MED REFILLS\"  pool to inform the patient and the pharmacy.    Sophie Leong RN      "

## 2025-01-15 RX ORDER — ATENOLOL 50 MG/1
75 TABLET ORAL DAILY
Qty: 135 TABLET | Refills: 0 | Status: SHIPPED | OUTPATIENT
Start: 2025-01-15

## 2025-04-06 ENCOUNTER — MYC REFILL (OUTPATIENT)
Dept: FAMILY MEDICINE | Facility: CLINIC | Age: 20
End: 2025-04-06
Payer: COMMERCIAL

## 2025-04-06 DIAGNOSIS — I10 HYPERTENSION, UNSPECIFIED TYPE: ICD-10-CM

## 2025-04-07 RX ORDER — ATENOLOL 50 MG/1
75 TABLET ORAL DAILY
Qty: 135 TABLET | Refills: 0 | Status: SHIPPED | OUTPATIENT
Start: 2025-04-07

## 2025-04-07 NOTE — TELEPHONE ENCOUNTER
"Request for medication refill:    Medication Name: atenolol (TENORMIN) 50 MG tablet     Providers if patient needs an appointment and you are willing to give a one month supply please refill for one month and  send a MyChart using \".SMILLIMITEDREFILL\" .Or route chart to \"P [facility] \" . To call patient and inform of limited refill and providers request to make an appointment. (Giving one month refill in non controlled medications is strongly recommended before denial)    If refill has been denied, meaning absolutely no refills without visit, please complete the smart phrase \".SMIRXREFUSE\" and route it to the \"P [facility] MED REFILLS\"  pool to inform the patient and the pharmacy.    Wesly Flood [facility]      "

## 2025-05-20 ENCOUNTER — MYC REFILL (OUTPATIENT)
Dept: FAMILY MEDICINE | Facility: CLINIC | Age: 20
End: 2025-05-20
Payer: COMMERCIAL

## 2025-05-20 DIAGNOSIS — F41.1 GAD (GENERALIZED ANXIETY DISORDER): ICD-10-CM

## 2025-05-20 RX ORDER — BUSPIRONE HYDROCHLORIDE 15 MG/1
15 TABLET ORAL 2 TIMES DAILY
Qty: 60 TABLET | Refills: 1 | Status: SHIPPED | OUTPATIENT
Start: 2025-05-20

## 2025-05-20 NOTE — TELEPHONE ENCOUNTER
Patient needs appointment prior to next refill, 30 day supply with 1 refill given to patient,please schedule appointment. Thanks, Katty Oviedo MD

## 2025-06-09 NOTE — GROUP NOTE
"Group Therapy Documentation    PATIENT'S NAME: Carroll Duke  MRN:   [id number]  :   2005  ACCT. NUMBER: [id number]  DATE OF SERVICE: 21  START TIME:  8:30 AM  END TIME:  9:00 AM  FACILITATOR(S): Claudia Valdez Luke T  TOPIC: BEH Group Therapy  Number of patients attending the group:  12  Group Length:  0.5 Hours    Dimensions addressed 3, 4, 5, and 6    Summary of Group / Topics Discussed:    Group Therapy/Process Group:  Community Group  Patient completed diary card ratings for the last 24 hours including emotions, safety concerns, substance use, treatment interfering behaviors, and use of DBT skills.  Patient checked in regarding the previous evening as well as progress on treatment goals.    Patient Session Goals / Objectives:  * Patient will increase awareness of emotions and ability to identify them  * Patient will report substance use and safety concerns   * Patient will increase use of DBT skills            Group Attendance:  Attended group session    Patient's response to the group topic/interactions:  cooperative with task    Patient appeared to be Actively participating.       Client specific details:  Client reported feeling tired and anxious.  Client used the following skills over the weekend:  Watch T.V. and play video games.  Client reported feeling grateful for his cats, pond, and deck.  Clients goal for treatment, per client, is to \"get out of treatment.\"    "
Group Therapy Documentation    PATIENT'S NAME: Carroll Duke  MRN:   [id number]  :   2005  ACCT. NUMBER: [id number]  DATE OF SERVICE: 21  START TIME:  9:00 AM   END TIME: 10:30am  FACILITATOR(S): Fernanda Velasquez LADC; Amarilys Holcomb; Godwin Moore  TOPIC: BEH Group Therapy  Number of patients attending the group:  12  Group Length:  1.5 Hours    Dimensions addressed 3, 4, 5, and 6    Summary of Group / Topics Discussed:    Group Therapy/Process Group:  Dual Process Group - goal is to increase adaptive coping, gain insight, and receive feedback in order to change negative behavior. Client's will identify topics and process as needed.    Topics:    Stage applications    Breaking up with significant other    Loved one and legal issues    Difficulty of behavior change    Timeline     Psychosis concerns    Lack of motivation      Group Attendance:  Attended group session    Patient's response to the group topic/interactions:  cooperative with task    Patient appeared to be Actively participating.       Client specific details:  Client did not process during this group and gave minimal feedback to treatment peers.    
Group Therapy Documentation    PATIENT'S NAME: Carroll Duke  MRN:   [id number]  :   2005  ACCT. NUMBER: [id number]  DATE OF SERVICE: 21  START TIME: 11:00 AM  END TIME: 12:00 PM  FACILITATOR(S): Fernanda Velasquez LADC; Amarilys Holcomb  TOPIC: BEH Group Therapy  Number of patients attending the group:  12  Group Length:  1 Hours    Dimensions addressed 3, 4, 5, and 6    Summary of Group / Topics Discussed:    Mindfulness:  States of Mind: Client engaged in a review of DBT, the meaning, modules, and purpose, specifically mindfulness. Client was provided information regarding the three states of mind (emotional, wise, and rational), with focus on wise mind. Client then engaged in a mindfulness activity using a singing bowl to end the group.       Group Attendance:  Attended group session    Patient's response to the group topic/interactions:  discussed personal experience with topic    Patient appeared to be Attentive.       Client specific details:  Client was a mostly quiet participant in group today.    
Patient

## 2025-07-08 ENCOUNTER — TELEPHONE (OUTPATIENT)
Dept: FAMILY MEDICINE | Facility: CLINIC | Age: 20
End: 2025-07-08
Payer: COMMERCIAL

## 2025-07-08 NOTE — TELEPHONE ENCOUNTER
Called and left a voicemail for pt regarding medication refill. See encounter 07/04/25. Pt need an appointment per Dr. PARRA

## 2025-07-14 ENCOUNTER — PATIENT OUTREACH (OUTPATIENT)
Dept: CARE COORDINATION | Facility: CLINIC | Age: 20
End: 2025-07-14
Payer: COMMERCIAL

## 2025-07-16 ENCOUNTER — PATIENT OUTREACH (OUTPATIENT)
Dept: CARE COORDINATION | Facility: CLINIC | Age: 20
End: 2025-07-16
Payer: COMMERCIAL

## (undated) RX ORDER — LIDOCAINE HYDROCHLORIDE 10 MG/ML
INJECTION, SOLUTION EPIDURAL; INFILTRATION; INTRACAUDAL; PERINEURAL
Status: DISPENSED
Start: 2022-08-19

## (undated) RX ORDER — LIDOCAINE HYDROCHLORIDE 10 MG/ML
INJECTION, SOLUTION EPIDURAL; INFILTRATION; INTRACAUDAL; PERINEURAL
Status: DISPENSED
Start: 2022-05-26

## (undated) RX ORDER — LIDOCAINE HYDROCHLORIDE 10 MG/ML
INJECTION, SOLUTION EPIDURAL; INFILTRATION; INTRACAUDAL; PERINEURAL
Status: DISPENSED
Start: 2022-07-21

## (undated) RX ORDER — LIDOCAINE HYDROCHLORIDE 10 MG/ML
INJECTION, SOLUTION EPIDURAL; INFILTRATION; INTRACAUDAL; PERINEURAL
Status: DISPENSED
Start: 2022-04-28

## (undated) RX ORDER — FENTANYL CITRATE 50 UG/ML
INJECTION, SOLUTION INTRAMUSCULAR; INTRAVENOUS
Status: DISPENSED
Start: 2022-09-19

## (undated) RX ORDER — LIDOCAINE HYDROCHLORIDE 10 MG/ML
INJECTION, SOLUTION EPIDURAL; INFILTRATION; INTRACAUDAL; PERINEURAL
Status: DISPENSED
Start: 2021-12-09

## (undated) RX ORDER — LIDOCAINE HYDROCHLORIDE 10 MG/ML
INJECTION, SOLUTION EPIDURAL; INFILTRATION; INTRACAUDAL; PERINEURAL
Status: DISPENSED
Start: 2022-03-31

## (undated) RX ORDER — LIDOCAINE HYDROCHLORIDE 10 MG/ML
INJECTION, SOLUTION EPIDURAL; INFILTRATION; INTRACAUDAL; PERINEURAL
Status: DISPENSED
Start: 2022-03-03

## (undated) RX ORDER — LIDOCAINE HYDROCHLORIDE 10 MG/ML
INJECTION, SOLUTION EPIDURAL; INFILTRATION; INTRACAUDAL; PERINEURAL
Status: DISPENSED
Start: 2022-06-23

## (undated) RX ORDER — LIDOCAINE HYDROCHLORIDE 10 MG/ML
INJECTION, SOLUTION EPIDURAL; INFILTRATION; INTRACAUDAL; PERINEURAL
Status: DISPENSED
Start: 2022-01-06

## (undated) RX ORDER — LIDOCAINE HYDROCHLORIDE 10 MG/ML
INJECTION, SOLUTION EPIDURAL; INFILTRATION; INTRACAUDAL; PERINEURAL
Status: DISPENSED
Start: 2022-02-03